# Patient Record
Sex: FEMALE | Race: ASIAN | NOT HISPANIC OR LATINO | Employment: UNEMPLOYED | URBAN - METROPOLITAN AREA
[De-identification: names, ages, dates, MRNs, and addresses within clinical notes are randomized per-mention and may not be internally consistent; named-entity substitution may affect disease eponyms.]

---

## 2017-06-07 ENCOUNTER — AMBULATORY - HEALTHEAST (OUTPATIENT)
Dept: MULTI SPECIALTY CLINIC | Facility: CLINIC | Age: 52
End: 2017-06-07

## 2017-06-07 LAB
HPV_EXT - HISTORICAL: NORMAL
PAP SMEAR - HIM PATIENT REPORTED: NORMAL

## 2019-12-11 ENCOUNTER — OFFICE VISIT - HEALTHEAST (OUTPATIENT)
Dept: FAMILY MEDICINE | Facility: CLINIC | Age: 54
End: 2019-12-11

## 2019-12-11 ENCOUNTER — COMMUNICATION - HEALTHEAST (OUTPATIENT)
Dept: ADMINISTRATIVE | Facility: CLINIC | Age: 54
End: 2019-12-11

## 2019-12-11 ENCOUNTER — RECORDS - HEALTHEAST (OUTPATIENT)
Dept: MAMMOGRAPHY | Facility: CLINIC | Age: 54
End: 2019-12-11

## 2019-12-11 DIAGNOSIS — Z12.11 SCREEN FOR COLON CANCER: ICD-10-CM

## 2019-12-11 DIAGNOSIS — Z12.11 SPECIAL SCREENING FOR MALIGNANT NEOPLASMS, COLON: ICD-10-CM

## 2019-12-11 DIAGNOSIS — Z12.31 ENCOUNTER FOR SCREENING MAMMOGRAM FOR MALIGNANT NEOPLASM OF BREAST: ICD-10-CM

## 2019-12-11 DIAGNOSIS — Z12.31 VISIT FOR SCREENING MAMMOGRAM: ICD-10-CM

## 2019-12-11 DIAGNOSIS — Z23 NEED FOR IMMUNIZATION AGAINST INFLUENZA: ICD-10-CM

## 2019-12-11 DIAGNOSIS — R05.9 COUGH: ICD-10-CM

## 2019-12-11 DIAGNOSIS — J30.2 SEASONAL ALLERGIC RHINITIS, UNSPECIFIED TRIGGER: ICD-10-CM

## 2019-12-11 RX ORDER — CETIRIZINE HYDROCHLORIDE 10 MG/1
10 TABLET ORAL DAILY
Qty: 30 TABLET | Refills: 2 | Status: SHIPPED | OUTPATIENT
Start: 2019-12-11

## 2019-12-11 ASSESSMENT — MIFFLIN-ST. JEOR: SCORE: 1153.6

## 2020-01-29 ENCOUNTER — AMBULATORY - HEALTHEAST (OUTPATIENT)
Dept: NURSING | Facility: CLINIC | Age: 55
End: 2020-01-29

## 2020-02-07 ENCOUNTER — RECORDS - HEALTHEAST (OUTPATIENT)
Dept: ADMINISTRATIVE | Facility: OTHER | Age: 55
End: 2020-02-07

## 2021-06-04 VITALS
DIASTOLIC BLOOD PRESSURE: 72 MMHG | RESPIRATION RATE: 16 BRPM | OXYGEN SATURATION: 98 % | HEIGHT: 58 IN | TEMPERATURE: 98 F | SYSTOLIC BLOOD PRESSURE: 118 MMHG | BODY MASS INDEX: 30.86 KG/M2 | WEIGHT: 147 LBS | HEART RATE: 90 BPM

## 2021-06-04 NOTE — PROGRESS NOTES
"Juanjo Avitia is a 54 y.o. female here for establishing care and for cough.     ASSESSMENT/PLAN:   Juanjo was seen today for establish care and cough.    Diagnoses and all orders for this visit:    Seasonal allergic rhinitis, unspecified trigger  Cough  Follow up if no improvement. Symptoms and story consistent with allergies. Recommend stop chewing tobacco.   -     cetirizine (ZYRTEC) 10 MG tablet; Take 1 tablet (10 mg total) by mouth daily.  -     fluticasone propionate (FLONASE) 50 mcg/actuation nasal spray; 1 spray into each nostril daily.      Visit for screening mammogram  -     Mammo Screening Bilateral; Future      Screen for colon cancer  -     Ambulatory referral for Colonoscopy      Need for immunization against influenza  -     Influenza, Recombinant, Inj, Quadrivalent, PF, 18+YRS        Return in about 3 months (around 3/11/2020) for Annual physical.   Due for preventative visit.     ======================================================    SUBJECTIVE  Juanjo Avitia is a 54 y.o. female here for establish care and cough.     Cough has been present for 3 months. Non productive cough. Itchy eyes and ears. Has slowly been improving. She has been living in [facility] for 2 1/2 years. No fevers, nausea, vomiting. No history of TB. Per chart, has been tested and cleared by [facility].     Pap done 6/7/2017 with negative HPV  Lipids 2017, normal except lipids  Ok with scheduling mammogram and colonoscopy today.     [facility]. Health maintenance up to date. No chronic medications.       ROS  Complete 10 point review of systems negative except as noted above in HPI    Reviewed Past Medical History, Medications, Family History and Social History in Epic and up to date with no new changes.    OBJECTIVE  /72   Pulse 90   Temp 98  F (36.7  C) (Oral)   Resp 16   Ht 4' 9.5\" (1.461 m)   Wt 147 lb (66.7 kg)   SpO2 98%   BMI 31.26 kg/m       General: Cooperative, pleasant, in no " acute distress  HEENT: PERRL, EOMI. Mild conjunctival injection bilaterally. Mild erythema of external ear canals. Normal pharynx, normal tonsils.   Neck: no lymphadenopathy, no masses  CV: RRR, normal S1/S2, no murmur, rubs, gallops  Resp: No respiratory distress. Clear to auscultation bilaterally. No wheezes, rales, rhonchi  Abd: Nontender, nondistended, bowel sounds present  Ext: radial/pedal pulses +2 bilaterally  MSK: Normal muscle tone  Neuro: CN II-XII intact  Skin: warm, well perfused. No rashes    LABS & IMAGES   None indicated     ======================================================    Visit was completed along with in person Kathi       Options for treatment and follow-up care were reviewed with the patient. Juanjo Avitia and/or guardian was engaged and actively involved in the decision making process. Juanjo Avitia and/or guardian verbalized understanding of the options discussed and was satisfied with the final plan.      Brittany Collazo MD

## 2021-06-04 NOTE — TELEPHONE ENCOUNTER
Pt is sched for Colonoscopy on:    Appointment: 02/07/2020  Arrival Time: 12:15pm  Provider: Pan Donahue MD    [facility]  [address] [place] [address]  Phone: [phone]      Her Colon Prep Teaching is scheduled for 1/29/20 at 1:15pm w/ RLN KELSEY. Please send 2 32 oz of Clear Oralyte to [facility]. She is aware to pick these up and bring with to [facility] appt. 1 [facility] Kit will be provided for her. Thanks.

## 2021-06-05 NOTE — PROGRESS NOTES
Patient reports she has not have previous colonoscopy.  She has not yet picked up Oralyte at pharmacy.  Order is in.  Instructed her to  2 bottles at [facility] before Thursday next week; not pink or red.  Instructions given via .  Patient had questions answered and indicated she understood.  The [facility] phone number was circled, in case she had further questions about the prep.  Copy of current medication and allergy list given.

## 2021-06-16 PROBLEM — K21.9 GASTROESOPHAGEAL REFLUX DISEASE: Status: ACTIVE | Noted: 2017-05-31

## 2021-06-16 PROBLEM — K08.9 POOR DENTITION: Status: ACTIVE | Noted: 2017-05-31

## 2021-06-16 PROBLEM — Z12.4 CERVICAL CANCER SCREENING: Status: ACTIVE | Noted: 2017-06-13

## 2021-06-16 PROBLEM — H53.8 BLURRED VISION: Status: ACTIVE | Noted: 2017-05-31

## 2021-06-16 PROBLEM — R93.89 ABNORMAL CHEST X-RAY: Status: ACTIVE | Noted: 2017-05-15

## 2021-06-16 PROBLEM — E55.9 VITAMIN D DEFICIENCY: Status: ACTIVE | Noted: 2017-05-31

## 2021-10-21 ENCOUNTER — IMMUNIZATION (OUTPATIENT)
Dept: FAMILY MEDICINE | Facility: CLINIC | Age: 56
End: 2021-10-21
Payer: COMMERCIAL

## 2021-10-21 PROCEDURE — 90686 IIV4 VACC NO PRSV 0.5 ML IM: CPT

## 2021-10-21 PROCEDURE — 90471 IMMUNIZATION ADMIN: CPT

## 2023-08-31 ENCOUNTER — OFFICE VISIT (OUTPATIENT)
Dept: FAMILY MEDICINE | Facility: CLINIC | Age: 58
End: 2023-08-31
Payer: COMMERCIAL

## 2023-08-31 VITALS
HEIGHT: 58 IN | RESPIRATION RATE: 16 BRPM | HEART RATE: 79 BPM | DIASTOLIC BLOOD PRESSURE: 62 MMHG | OXYGEN SATURATION: 96 % | TEMPERATURE: 98.7 F | SYSTOLIC BLOOD PRESSURE: 104 MMHG | WEIGHT: 152.12 LBS | BODY MASS INDEX: 31.93 KG/M2

## 2023-08-31 DIAGNOSIS — H10.13 ALLERGIC CONJUNCTIVITIS, BILATERAL: ICD-10-CM

## 2023-08-31 DIAGNOSIS — Z12.4 SCREENING FOR CERVICAL CANCER: Primary | ICD-10-CM

## 2023-08-31 DIAGNOSIS — J30.2 SEASONAL ALLERGIC RHINITIS, UNSPECIFIED TRIGGER: ICD-10-CM

## 2023-08-31 DIAGNOSIS — M25.562 CHRONIC PAIN OF BOTH KNEES: ICD-10-CM

## 2023-08-31 DIAGNOSIS — Z13.29 SCREENING FOR THYROID DISORDER: ICD-10-CM

## 2023-08-31 DIAGNOSIS — Z12.31 VISIT FOR SCREENING MAMMOGRAM: ICD-10-CM

## 2023-08-31 DIAGNOSIS — G89.29 CHRONIC PAIN OF BOTH KNEES: ICD-10-CM

## 2023-08-31 DIAGNOSIS — Z13.1 SCREENING FOR DIABETES MELLITUS: ICD-10-CM

## 2023-08-31 DIAGNOSIS — Z12.11 SCREENING FOR COLON CANCER: ICD-10-CM

## 2023-08-31 DIAGNOSIS — Z13.220 SCREENING FOR HYPERLIPIDEMIA: ICD-10-CM

## 2023-08-31 DIAGNOSIS — Z13.0 SCREENING, ANEMIA, DEFICIENCY, IRON: ICD-10-CM

## 2023-08-31 DIAGNOSIS — E55.9 VITAMIN D DEFICIENCY: ICD-10-CM

## 2023-08-31 DIAGNOSIS — M25.561 CHRONIC PAIN OF BOTH KNEES: ICD-10-CM

## 2023-08-31 LAB
ALBUMIN SERPL BCG-MCNC: 4.3 G/DL (ref 3.5–5.2)
ALP SERPL-CCNC: 84 U/L (ref 35–104)
ALT SERPL W P-5'-P-CCNC: 14 U/L (ref 0–50)
ANION GAP SERPL CALCULATED.3IONS-SCNC: 11 MMOL/L (ref 7–15)
AST SERPL W P-5'-P-CCNC: 24 U/L (ref 0–45)
BILIRUB SERPL-MCNC: 0.4 MG/DL
BUN SERPL-MCNC: 11.7 MG/DL (ref 6–20)
CALCIUM SERPL-MCNC: 8.9 MG/DL (ref 8.6–10)
CHLORIDE SERPL-SCNC: 107 MMOL/L (ref 98–107)
CHOLEST SERPL-MCNC: 216 MG/DL
CREAT SERPL-MCNC: 0.91 MG/DL (ref 0.51–0.95)
DEPRECATED CALCIDIOL+CALCIFEROL SERPL-MC: 14 UG/L (ref 20–75)
DEPRECATED HCO3 PLAS-SCNC: 26 MMOL/L (ref 22–29)
ERYTHROCYTE [DISTWIDTH] IN BLOOD BY AUTOMATED COUNT: 11.6 % (ref 10–15)
GFR SERPL CREATININE-BSD FRML MDRD: 74 ML/MIN/1.73M2
GLUCOSE SERPL-MCNC: 108 MG/DL (ref 70–99)
HCT VFR BLD AUTO: 41 % (ref 35–47)
HDLC SERPL-MCNC: 40 MG/DL
HGB BLD-MCNC: 12.9 G/DL (ref 11.7–15.7)
LDLC SERPL CALC-MCNC: 132 MG/DL
MCH RBC QN AUTO: 28.5 PG (ref 26.5–33)
MCHC RBC AUTO-ENTMCNC: 31.5 G/DL (ref 31.5–36.5)
MCV RBC AUTO: 91 FL (ref 78–100)
NONHDLC SERPL-MCNC: 176 MG/DL
PLATELET # BLD AUTO: 223 10E3/UL (ref 150–450)
POTASSIUM SERPL-SCNC: 3.8 MMOL/L (ref 3.4–5.3)
PROT SERPL-MCNC: 7.5 G/DL (ref 6.4–8.3)
RBC # BLD AUTO: 4.53 10E6/UL (ref 3.8–5.2)
SODIUM SERPL-SCNC: 144 MMOL/L (ref 136–145)
TRIGL SERPL-MCNC: 222 MG/DL
TSH SERPL DL<=0.005 MIU/L-ACNC: 1.6 UIU/ML (ref 0.3–4.2)
WBC # BLD AUTO: 7.1 10E3/UL (ref 4–11)

## 2023-08-31 PROCEDURE — 80053 COMPREHEN METABOLIC PANEL: CPT | Performed by: FAMILY MEDICINE

## 2023-08-31 PROCEDURE — 82306 VITAMIN D 25 HYDROXY: CPT | Performed by: FAMILY MEDICINE

## 2023-08-31 PROCEDURE — 85027 COMPLETE CBC AUTOMATED: CPT | Performed by: FAMILY MEDICINE

## 2023-08-31 PROCEDURE — 80061 LIPID PANEL: CPT | Performed by: FAMILY MEDICINE

## 2023-08-31 PROCEDURE — 99204 OFFICE O/P NEW MOD 45 MIN: CPT | Performed by: FAMILY MEDICINE

## 2023-08-31 PROCEDURE — 84443 ASSAY THYROID STIM HORMONE: CPT | Performed by: FAMILY MEDICINE

## 2023-08-31 PROCEDURE — 36415 COLL VENOUS BLD VENIPUNCTURE: CPT | Performed by: FAMILY MEDICINE

## 2023-08-31 RX ORDER — FLUTICASONE PROPIONATE 50 MCG
1 SPRAY, SUSPENSION (ML) NASAL DAILY
Qty: 16 G | Refills: 3 | Status: SHIPPED | OUTPATIENT
Start: 2023-08-31

## 2023-08-31 RX ORDER — OLOPATADINE HYDROCHLORIDE 1 MG/ML
1 SOLUTION/ DROPS OPHTHALMIC 2 TIMES DAILY
Qty: 5 ML | Refills: 3 | Status: SHIPPED | OUTPATIENT
Start: 2023-08-31

## 2023-08-31 RX ORDER — ACETAMINOPHEN 500 MG
500-1000 TABLET ORAL 3 TIMES DAILY PRN
Qty: 100 TABLET | Refills: 3 | Status: SHIPPED | OUTPATIENT
Start: 2023-08-31

## 2023-08-31 RX ORDER — NAPROXEN 500 MG/1
500 TABLET ORAL 2 TIMES DAILY PRN
Qty: 60 TABLET | Refills: 3 | Status: SHIPPED | OUTPATIENT
Start: 2023-08-31

## 2023-08-31 NOTE — PROGRESS NOTES
"  Assessment & Plan     ESTABLISH CARE  Patient has not been seen in over 4 years.     Screening for cervical cancer - deferred to next visit.   Screening for colon cancer - deferred  Screening for diabetes mellitus - Comprehensive metabolic panel   Screening, anemia, deficiency, iron - CBC with platelets  Screening for thyroid disorder - TSH with free T4 reflex  Screening for hyperlipidemia - Lipid panel reflex to direct LDL Non-fasting  Vitamin D deficiency - Vitamin D Deficiency  Visit for screening mammogram - MA SCREENING DIGITAL BILAT - Future  (s+30)    Chronic pain of both knees  Xray ordered but not available today in clinic. Regardless, does not change initial management with medications, consider PT but deferred for today.   - acetaminophen (TYLENOL) 500 MG tablet  Dispense: 100 tablet; Refill: 3  - naproxen (NAPROSYN) 500 MG tablet  Dispense: 60 tablet; Refill: 3  - diclofenac (VOLTAREN) 1 % topical gel  Dispense: 350 g; Refill: 3  - XR Knee Bilateral 3 Views    Seasonal allergic rhinitis, unspecified trigger  - fluticasone (FLONASE) 50 MCG/ACT nasal spray  Dispense: 16 g; Refill: 3    Allergic conjunctivitis, bilateral  - olopatadine (PATANOL) 0.1 % ophthalmic solution  Dispense: 5 mL; Refill: 3               BMI:   Estimated body mass index is 32.24 kg/m  as calculated from the following:    Height as of this encounter: 1.463 m (4' 9.6\").    Weight as of this encounter: 69 kg (152 lb 1.9 oz).   Weight management plan: Patient was referred to their PCP to discuss a diet and exercise plan.      Return in about 3 months (around 11/30/2023) for knee pain.    Patient has not been seen in 4 years. This is an establish care visit and not a physical.     Knee pain.   Started over a month ago, a month of really bad pain. It's on and off.   She has no idea if it was gradual or not.   She did not fall or injure it in any way.   Sitting is fine, but if she gets up and walks the pain is there.   Feels if she walks, " "she thinks her legs are going to fall off.   When she was younger, she had this pain and it would go away for a long time.   She is taking tylenol for the pain only.     Itchy eyes, watery eyes.   On and off but happens every year. Worse than this year.           Brittany Collazo MD  [facility] RAFAL Geiger is a 57 year old, presenting for the following health issues:  Knee Pain and Eye Itching Both Eyes        HPI       Hasn't been back to clinic for a while. She says she is getting old now and everythign hurts due to age.   She has knee pain and itchy eyes.   She thinks knee pain is due to age.   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   Pain with walking.   Not using any medications right now.     She has itchy eyes, comes and goes. No pain, no change in vision.       Review of Systems   Constitutional, HEENT, cardiovascular, pulmonary, gi and gu systems are negative, except as otherwise noted.      Objective    /62   Pulse 79   Temp 98.7  F (37.1  C) (Oral)   Resp 16   Ht 1.463 m (4' 9.6\")   Wt 69 kg (152 lb 1.9 oz)   SpO2 96%   BMI 32.24 kg/m    Body mass index is 32.24 kg/m .  Physical Exam   GENERAL: healthy, alert and no distress  NECK: no adenopathy, no asymmetry, masses, or scars and thyroid normal to palpation  RESP: lungs clear to auscultation - no rales, rhonchi or wheezes  CV: regular rate and rhythm, normal S1 S2, no S3 or S4, no murmur, click or rub, no peripheral edema and peripheral pulses strong  ABDOMEN: soft, nontender, no hepatosplenomegaly, no masses and bowel sounds normal  MS: no gross musculoskeletal defects noted, no edema. Crepitus in both knees.     Results for orders placed or performed in visit on 08/31/23   CBC with platelets     Status: Normal   Result Value Ref Range    WBC Count 7.1 4.0 - 11.0 10e3/uL    RBC Count 4.53 3.80 - 5.20 10e6/uL    Hemoglobin 12.9 11.7 - 15.7 g/dL    " Hematocrit 41.0 35.0 - 47.0 %    MCV 91 78 - 100 fL    MCH 28.5 26.5 - 33.0 pg    MCHC 31.5 31.5 - 36.5 g/dL    RDW 11.6 10.0 - 15.0 %    Platelet Count 223 150 - 450 10e3/uL   Comprehensive metabolic panel (BMP + Alb, Alk Phos, ALT, AST, Total. Bili, TP)     Status: Abnormal   Result Value Ref Range    Sodium 144 136 - 145 mmol/L    Potassium 3.8 3.4 - 5.3 mmol/L    Chloride 107 98 - 107 mmol/L    Carbon Dioxide (CO2) 26 22 - 29 mmol/L    Anion Gap 11 7 - 15 mmol/L    Urea Nitrogen 11.7 6.0 - 20.0 mg/dL    Creatinine 0.91 0.51 - 0.95 mg/dL    Calcium 8.9 8.6 - 10.0 mg/dL    Glucose 108 (H) 70 - 99 mg/dL    Alkaline Phosphatase 84 35 - 104 U/L    AST 24 0 - 45 U/L    ALT 14 0 - 50 U/L    Protein Total 7.5 6.4 - 8.3 g/dL    Albumin 4.3 3.5 - 5.2 g/dL    Bilirubin Total 0.4 <=1.2 mg/dL    GFR Estimate 74 >60 mL/min/1.73m2   Lipid panel reflex to direct LDL Non-fasting     Status: Abnormal   Result Value Ref Range    Cholesterol 216 (H) <200 mg/dL    Triglycerides 222 (H) <150 mg/dL    Direct Measure HDL 40 (L) >=50 mg/dL    LDL Cholesterol Calculated 132 (H) <=100 mg/dL    Non HDL Cholesterol 176 (H) <130 mg/dL    Narrative    Cholesterol  Desirable:  <200 mg/dL    Triglycerides  Normal:  Less than 150 mg/dL  Borderline High:  150-199 mg/dL  High:  200-499 mg/dL  Very High:  Greater than or equal to 500 mg/dL    Direct Measure HDL  Female:  Greater than or equal to 50 mg/dL   Male:  Greater than or equal to 40 mg/dL    LDL Cholesterol  Desirable:  <100mg/dL  Above Desirable:  100-129 mg/dL   Borderline High:  130-159 mg/dL   High:  160-189 mg/dL   Very High:  >= 190 mg/dL    Non HDL Cholesterol  Desirable:  130 mg/dL  Above Desirable:  130-159 mg/dL  Borderline High:  160-189 mg/dL  High:  190-219 mg/dL  Very High:  Greater than or equal to 220 mg/dL   TSH with free T4 reflex     Status: Normal   Result Value Ref Range    TSH 1.60 0.30 - 4.20 uIU/mL   Vitamin D Deficiency     Status: Abnormal   Result Value Ref Range     Vitamin D, Total (25-Hydroxy) 14 (L) 20 - 75 ug/L    Narrative    Season, race, dietary intake, and treatment affect the concentration of 25-hydroxy-Vitamin D. Values may decrease during winter months and increase during summer months. Values 20-29 ug/L may indicate Vitamin D insufficiency and values <20 ug/L may indicate Vitamin D deficiency.    Vitamin D determination is routinely performed by an immunoassay specific for 25 hydroxyvitamin D3.  If an individual is on vitamin D2(ergocalciferol) supplementation, please specify 25 OH vitamin D2 and D3 level determination by LCMSMS test VITD23.       No results found for this or any previous visit (from the past 24 hour(s)).

## 2023-11-08 ENCOUNTER — TELEPHONE (OUTPATIENT)
Dept: MAMMOGRAPHY | Facility: CLINIC | Age: 58
End: 2023-11-08
Payer: COMMERCIAL

## 2023-11-08 NOTE — TELEPHONE ENCOUNTER
Patient Quality Outreach    Patient is due for the following:   Breast Cancer Screening - Mammogram    Next Steps:   Schedule a office visit for mammo    Type of outreach:    Phone, left message for patient/parent to call back.    Next Steps:  Reach out within 90 days via Phone.    Max number of attempts reached: No. Will try again in 90 days if patient still on fail list.    Questions for provider review:    None           SANDY Hall  Chart routed to Care Team.

## 2023-11-28 ENCOUNTER — TELEPHONE (OUTPATIENT)
Dept: MAMMOGRAPHY | Facility: CLINIC | Age: 58
End: 2023-11-28
Payer: COMMERCIAL

## 2023-11-28 NOTE — TELEPHONE ENCOUNTER
Patient Quality Outreach    Patient is due for the following:   Breast Cancer Screening - Mammogram    Next Steps:   Schedule a office visit for mammo    Type of outreach:    Phone, left message for patient/parent to call back.  2nd call    Next Steps:  Reach out within 90 days via Phone.    Max number of attempts reached: No. Will try again in 90 days if patient still on fail list.    Questions for provider review:    None           SANDY Hall  Chart routed to Care Team.

## 2024-02-27 ENCOUNTER — TELEPHONE (OUTPATIENT)
Dept: MAMMOGRAPHY | Facility: CLINIC | Age: 59
End: 2024-02-27
Payer: COMMERCIAL

## 2024-02-27 NOTE — TELEPHONE ENCOUNTER
Patient Quality Outreach    Patient is due for the following:   Breast Cancer Screening - Mammogram    Next Steps:   Schedule a office visit for mammo    Type of outreach:    Unable to LM    Next Steps:  Reach out within 90 days via Phone.    Max number of attempts reached: No. Will try again in 90 days if patient still on fail list.    Questions for provider review:    None           SANDY Hall  Chart routed to Care Team.

## 2024-06-20 ENCOUNTER — TELEPHONE (OUTPATIENT)
Dept: MAMMOGRAPHY | Facility: CLINIC | Age: 59
End: 2024-06-20
Payer: COMMERCIAL

## 2024-06-20 NOTE — TELEPHONE ENCOUNTER
Patient Quality Outreach    Patient is due for the following:   Breast Cancer Screening - Mammogram    Next Steps:   Schedule a office visit for mammo    Type of outreach:    Mailbox full, unable to LM    Next Steps:  Reach out within 90 days via Phone.    Max number of attempts reached: No. Will try again in 90 days if patient still on fail list.    Questions for provider review:    None           SANDY Hall  Chart routed to Care Team.